# Patient Record
(demographics unavailable — no encounter records)

---

## 2024-11-27 NOTE — DISCUSSION/SUMMARY
[de-identified] : Chief complaint: Right hand pain, right wrist pain  HPI: Patient is a 36-year-old right-hand-dominant male presents the office today for the evaluation of pain to the right hand and to the right wrist which manifested yesterday/last night.  Patient works as a [employer]sh[employer].  He reports that he was tying something when his hand slipped off of a metal pipe resulting in pain to the right wrist and to the right hand which is associated with occasional numbness to the fingers depending on positioning of his wrist.  Patient presented to [facility] urgent care.  No x-rays were performed.  Patient primarily states that he has pain localized over the dorsal aspect of the MP joints of the third, fourth, and fifth digits when he flexes those fingers.  He also reports numbness into the hand with palpation over the volar aspect of the wrist.  Denies any previous surgery or injury to the right hand or wrist.  ROS: Positive for right hand pain, right wrist pain  Physical examination of the right wrist and hand:  No appreciable edema No erythema No ecchymosis Skin is intact Good range of motion to the right wrist in flexion, extension, ulnar deviation, radial deviation Patient is able to make a full fist There is discomfort noted to the third, fourth, and fifth MP joints with flexion at these joints No appreciable bony tenderness over the radial shaft, distal radius, ulnar shaft, distal ulna No appreciable tenderness over the wrist or over the carpal bones Questionable minimal tenderness over the fourth metacarpal shaft There is otherwise no appreciable tenderness over the remainder of the hand including the MP joints or digits of the hand Distal sensation is grossly intact to light touch Radial pulse is 2+ Capillary refills less than 2 seconds  Three-view x-rays of the right wrist performed in the office today show no obvious acute displaced fracture, subluxation, or dislocation  Three-view x-rays of the right hand performed in the office today show no obvious acute displaced fracture, subluxation, or dislocation  Assessment/plan: Injury of the right wrist, discussed treatment options with the patient, today the patient will be provided with a right cock up wrist brace which he will wear at all times while active, he can remove this brace for resting, sleeping, and showering/hygiene, he can apply ice to the right wrist on an as-needed basis with sensory precautions, recommend that the patient elevate the right upper extremity above heart level when resting  A prescription for nabumetone 750 mg as needed twice daily with food was sent to the patient's pharmacy, confirmed no contraindications to NSAIDS. Patient denies being on a blood thinner. Denies history of GIB or GI ulcer.  Discussed in detail with the patient that they cannot take over-the-counter NSAIDs including but not limited to ibuprofen, Advil, Aleve, or Motrin while taking this medication.  They can continue to take over-the-counter Tylenol.  Patient will be provided with a 3-week follow-up with Dr. Gutierrez for repeat evaluation, he verbalized understanding of all findings in the office today, he agrees to follow-up as directed  Patient is 100% temporarily disabled at this time

## 2024-12-18 NOTE — ASSESSMENT
[FreeTextEntry1] :  The patient is being seen today for an injury of his right wrist. His 2nd through 4th fingers were pulled back when closing a large door.   He has numbness but things are improving.  R hand:  Tender volar wrist  Good finger ROM  +Tinels  +Phalens  -Compression test  Decreased sensation median nerve distribution -thenar atrophy  We discussed the trauma likely caused a compression of the nerve in his wrist. I recommend he continue to wear the brace as needed to rest his wrist. He will return to work in 2 weeks. He will follow up in 3-4 weeks for evaluation.  If he continues to have numbness, we may consider an EMG.

## 2025-01-17 NOTE — ASSESSMENT
[FreeTextEntry1] :  The patient is being seen today for an injury of his right wrist. He no longer has pain but feels tightness in his wrist when he makes a fist.  Especially in line with the ring finger  R hand:  Tender volar wrist  Good finger ROM  +Tinels  +Phalens  -Compression test  Normal sensation median nerve distribution -thenar atrophy  The patient is doing very well. He will return to work full duty.  He will continue with stretching.  I believe that this will resolve.  He will follow up in 6 weeks for evaluation.

## 2025-01-17 NOTE — WORK
[Mild Partial] : mild partial [Does not reveal pre-existing condition(s) that may affect treatment/prognosis] : does not reveal pre-existing condition(s) that may affect treatment/prognosis [Can return to work without limitations on ______] : can return to work without limitations on [unfilled] [I provided the services listed above] :  I provided the services listed above. [FreeTextEntry1] : fair [FreeTextEntry3] : lg